# Patient Record
Sex: FEMALE | Race: WHITE | NOT HISPANIC OR LATINO | URBAN - METROPOLITAN AREA
[De-identification: names, ages, dates, MRNs, and addresses within clinical notes are randomized per-mention and may not be internally consistent; named-entity substitution may affect disease eponyms.]

---

## 2021-02-19 ENCOUNTER — LAB OUTSIDE AN ENCOUNTER (OUTPATIENT)
Dept: URBAN - METROPOLITAN AREA CLINIC 82 | Facility: CLINIC | Age: 71
End: 2021-02-19

## 2021-02-19 ENCOUNTER — OFFICE VISIT (OUTPATIENT)
Dept: URBAN - METROPOLITAN AREA CLINIC 82 | Facility: CLINIC | Age: 71
End: 2021-02-19
Payer: MEDICARE

## 2021-02-19 DIAGNOSIS — R10.32 LLQ ABDOMINAL PAIN: ICD-10-CM

## 2021-02-19 DIAGNOSIS — R14.3 FLATULENCE: ICD-10-CM

## 2021-02-19 DIAGNOSIS — R19.4 CHANGE IN BOWEL HABITS: ICD-10-CM

## 2021-02-19 DIAGNOSIS — R14.2 ERUCTATION: ICD-10-CM

## 2021-02-19 DIAGNOSIS — K21.9 GASTROESOPHAGEAL REFLUX DISEASE, UNSPECIFIED WHETHER ESOPHAGITIS PRESENT: ICD-10-CM

## 2021-02-19 DIAGNOSIS — R14.1 GAS PAIN: ICD-10-CM

## 2021-02-19 PROBLEM — 271834000: Status: ACTIVE | Noted: 2021-02-19

## 2021-02-19 PROBLEM — 129851009: Status: ACTIVE | Noted: 2021-02-19

## 2021-02-19 PROBLEM — 271832001: Status: ACTIVE | Noted: 2021-02-19

## 2021-02-19 PROBLEM — 235595009: Status: ACTIVE | Noted: 2021-02-19

## 2021-02-19 PROCEDURE — 3017F COLORECTAL CA SCREEN DOC REV: CPT | Performed by: INTERNAL MEDICINE

## 2021-02-19 PROCEDURE — G8420 CALC BMI NORM PARAMETERS: HCPCS | Performed by: INTERNAL MEDICINE

## 2021-02-19 PROCEDURE — 99204 OFFICE O/P NEW MOD 45 MIN: CPT | Performed by: INTERNAL MEDICINE

## 2021-02-19 PROCEDURE — G8427 DOCREV CUR MEDS BY ELIG CLIN: HCPCS | Performed by: INTERNAL MEDICINE

## 2021-02-19 PROCEDURE — G8482 FLU IMMUNIZE ORDER/ADMIN: HCPCS | Performed by: INTERNAL MEDICINE

## 2021-02-19 RX ORDER — ONDANSETRON HYDROCHLORIDE 8 MG/1
HALF TO 1 TABLET AS NEEDED TABLET, FILM COATED ORAL ONCE A DAY
Qty: 10 TABLET | Refills: 0 | OUTPATIENT
Start: 2021-02-19

## 2021-02-19 RX ORDER — MESALAMINE 1.2 G/1
TABLET, DELAYED RELEASE ORAL
Qty: 0 | Refills: 0 | Status: DISCONTINUED | COMMUNITY
Start: 1900-01-01

## 2021-02-19 RX ORDER — POLYETHYLENE GLYCOL 3350 17 G/17G
AS DIRECTED POWDER, FOR SOLUTION ORAL
Qty: 238 GRAM | Refills: 0 | OUTPATIENT
Start: 2021-02-19 | End: 2021-02-20

## 2021-02-19 RX ORDER — ASPIRIN 81 MG/1
1 TABLET TABLET, COATED ORAL ONCE A DAY
Status: ACTIVE | COMMUNITY

## 2021-02-19 RX ORDER — LISINOPRIL 20 MG/1
TAKE 1 TABLET (20 MG) BY ORAL ROUTE ONCE DAILY TABLET ORAL 1
Qty: 0 | Refills: 0 | Status: ACTIVE | COMMUNITY
Start: 1900-01-01

## 2021-02-19 RX ORDER — ALENDRONATE SODIUM 70 MG
TAKE 1 TABLET (70 MG) BY ORAL ROUTE ONCE WEEKLY IN THE MORNING, AT LEAST 30 MIN BEFORE FIRST FOOD, BEVERAGE, OR MEDICATION OF DAY TABLET ORAL
Qty: 0 | Refills: 0 | Status: DISCONTINUED | COMMUNITY
Start: 1900-01-01

## 2021-02-19 NOTE — PHYSICAL EXAM HENT:
Head,  normocephalic,  atraumatic,  Face,  Face within normal limits,  Ears,  External ears within normal limits,  Nose/Nasopharynx,  External nose  normal appearance,  nares patent,  no nasal discharge,  Mouth and Throat,  Oral cavity appearance normal,  Breath odor normal,  Lips,  Appearance normal  upper dentures noted

## 2021-02-19 NOTE — PHYSICAL EXAM NEUROLOGIC:
benign tremor notedoriented to person, place and time , normal sensation , short and long term memory intact

## 2021-02-19 NOTE — PHYSICAL EXAM GASTROINTESTINAL
Abdomen , soft, nontender, nondistended , no guarding or rigidity , , normal bowel sounds , Liver and Spleen , no hepatomegaly present , no hepatosplenomegaly , liver nontender , spleen not palpable

## 2021-02-25 ENCOUNTER — CLAIMS CREATED FROM THE CLAIM WINDOW (OUTPATIENT)
Dept: URBAN - METROPOLITAN AREA CLINIC 4 | Facility: CLINIC | Age: 71
End: 2021-02-25
Payer: MEDICARE

## 2021-02-25 ENCOUNTER — OFFICE VISIT (OUTPATIENT)
Dept: URBAN - METROPOLITAN AREA SURGERY CENTER 13 | Facility: SURGERY CENTER | Age: 71
End: 2021-02-25
Payer: MEDICARE

## 2021-02-25 ENCOUNTER — TELEPHONE ENCOUNTER (OUTPATIENT)
Dept: URBAN - METROPOLITAN AREA CLINIC 92 | Facility: CLINIC | Age: 71
End: 2021-02-25

## 2021-02-25 DIAGNOSIS — B37.81 CANDIDAL ESOPHAGITIS: ICD-10-CM

## 2021-02-25 DIAGNOSIS — K31.89 ACQUIRED DEFORMITY OF DUODENUM: ICD-10-CM

## 2021-02-25 DIAGNOSIS — R10.32 ABDOMINAL CRAMPING IN LEFT LOWER QUADRANT: ICD-10-CM

## 2021-02-25 DIAGNOSIS — R10.13 ABDOMINAL DISCOMFORT, EPIGASTRIC: ICD-10-CM

## 2021-02-25 DIAGNOSIS — K31.89 DILATION OF STOMACH: ICD-10-CM

## 2021-02-25 DIAGNOSIS — B37.81 CANDIDA ESOPHAGITIS: ICD-10-CM

## 2021-02-25 DIAGNOSIS — K31.819 ANGIODYSPLASIA OF DUODENUM: ICD-10-CM

## 2021-02-25 PROCEDURE — 88312 SPECIAL STAINS GROUP 1: CPT | Performed by: PATHOLOGY

## 2021-02-25 PROCEDURE — 88305 TISSUE EXAM BY PATHOLOGIST: CPT | Performed by: PATHOLOGY

## 2021-02-25 PROCEDURE — 43239 EGD BIOPSY SINGLE/MULTIPLE: CPT | Performed by: INTERNAL MEDICINE

## 2021-02-25 PROCEDURE — 45378 DIAGNOSTIC COLONOSCOPY: CPT | Performed by: INTERNAL MEDICINE

## 2021-02-25 PROCEDURE — G8907 PT DOC NO EVENTS ON DISCHARG: HCPCS | Performed by: INTERNAL MEDICINE

## 2021-02-25 RX ORDER — FLUCONAZOLE 100 MG/1
2 TABS DAY ONE, THEN 1 TAB FOR 13 DAYS HOLD ATARVOSTATIN FOR 2 WEEKS TABLET ORAL ONCE A DAY
Qty: 15 TABLET | Refills: 0 | OUTPATIENT
End: 2021-03-11

## 2021-02-25 RX ORDER — LISINOPRIL 20 MG/1
TAKE 1 TABLET (20 MG) BY ORAL ROUTE ONCE DAILY TABLET ORAL 1
Qty: 0 | Refills: 0 | Status: ACTIVE | COMMUNITY
Start: 1900-01-01

## 2021-02-25 RX ORDER — ONDANSETRON HYDROCHLORIDE 8 MG/1
HALF TO 1 TABLET AS NEEDED TABLET, FILM COATED ORAL ONCE A DAY
Qty: 10 TABLET | Refills: 0 | Status: ACTIVE | COMMUNITY
Start: 2021-02-19

## 2021-02-25 RX ORDER — ASPIRIN 81 MG/1
1 TABLET TABLET, COATED ORAL ONCE A DAY
Status: ACTIVE | COMMUNITY

## 2021-03-29 ENCOUNTER — DASHBOARD ENCOUNTERS (OUTPATIENT)
Age: 71
End: 2021-03-29

## 2021-03-29 ENCOUNTER — OFFICE VISIT (OUTPATIENT)
Dept: URBAN - METROPOLITAN AREA CLINIC 82 | Facility: CLINIC | Age: 71
End: 2021-03-29
Payer: MEDICARE

## 2021-03-29 DIAGNOSIS — Q43.8 REDUNDANT COLON: ICD-10-CM

## 2021-03-29 DIAGNOSIS — R14.1 GAS PAIN: ICD-10-CM

## 2021-03-29 DIAGNOSIS — B37.81 CANDIDA ESOPHAGITIS: ICD-10-CM

## 2021-03-29 DIAGNOSIS — K59.01 CONSTIPATION BY DELAYED COLONIC TRANSIT: ICD-10-CM

## 2021-03-29 PROBLEM — 45979003: Status: ACTIVE | Noted: 2021-02-19

## 2021-03-29 PROBLEM — 253794002: Status: ACTIVE | Noted: 2021-03-29

## 2021-03-29 PROBLEM — 35298007: Status: ACTIVE | Noted: 2021-03-29

## 2021-03-29 PROBLEM — 20639004: Status: ACTIVE | Noted: 2021-03-29

## 2021-03-29 PROCEDURE — 99213 OFFICE O/P EST LOW 20 MIN: CPT | Performed by: INTERNAL MEDICINE

## 2021-03-29 RX ORDER — ASPIRIN 81 MG/1
1 TABLET TABLET, COATED ORAL ONCE A DAY
Status: ACTIVE | COMMUNITY

## 2021-03-29 RX ORDER — ONDANSETRON HYDROCHLORIDE 8 MG/1
HALF TO 1 TABLET AS NEEDED TABLET, FILM COATED ORAL ONCE A DAY
Qty: 10 TABLET | Refills: 0 | Status: ACTIVE | COMMUNITY
Start: 2021-02-19

## 2021-03-29 RX ORDER — LINACLOTIDE 72 UG/1
1 CAPSULE AT LEAST 30 MINUTES BEFORE THE FIRST MEAL OF THE DAY ON AN EMPTY STOMACH CAPSULE, GELATIN COATED ORAL ONCE A DAY
Qty: 90 CAPSULE | Refills: 1 | OUTPATIENT
Start: 2021-03-29 | End: 2021-09-25

## 2021-03-29 RX ORDER — LISINOPRIL 20 MG/1
TAKE 1 TABLET (20 MG) BY ORAL ROUTE ONCE DAILY TABLET ORAL 1
Qty: 0 | Refills: 0 | Status: ACTIVE | COMMUNITY
Start: 1900-01-01

## 2021-03-29 NOTE — HPI-TODAY'S VISIT:
Ms. Lopez is here for complaints of having abdominal pain left lower quadrant abdominal pain discomfort as well as fullness and abdominal discomfort.  She reports seeing Dr. Carrillo Huff for left lower quadrant abdominal swelling.  She underwent a CT scan of the abdomen pelvis that shows a fascial defect in the abdominal wall area in the left lower quadrant region otherwise no obvious hernia was noted in the left lower quadrant.  She was also noted to have some small gallstones however she denies any right upper quadrant pain.  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  Reports having abdominal discomfort but denies any vomiting.  She reports having discomfort when she is having a bowel movement improves after having a bowel movement.  Other complaints also includes pain when she presses on the abdominal wall area.  She feels been noticeably swelling in the left lower quadrant especially when she stands however he did better improves when she lays back. She also reports having epigastric discomfort when she eats food and also having intermittent reflux which has been going on for years.  In the past she had been taking some osteoporosis medications which makes her symptoms worse.  Denies any dysphagia.  3/29/21:  Pt reports improvement in her GI symptoms especially upper GI symptoms after taking 2 weeks of Diflucan for candida esophagitis. She was also noted to have severe redundancy of the colon. She is not on any other laxatives . Post colonoscopy she did not have a bm for 1 weeks. She had taken some dulcolax which has caused lower abdominal cramping. Diagnostic studies /Procedures:Relevant procedures performed to date: reviewed

## 2023-02-07 NOTE — HPI-TODAY'S VISIT:
Ms. Lopez is here for complaints of having abdominal pain left lower quadrant abdominal pain discomfort as well as fullness and abdominal discomfort.  She reports seeing Dr. Carrillo Huff for left lower quadrant abdominal swelling.  She underwent a CT scan of the abdomen pelvis that shows a fascial defect in the abdominal wall area in the left lower quadrant region otherwise no obvious hernia was noted in the left lower quadrant.  She was also noted to have some small gallstones however she denies any right upper quadrant pain.  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  Reports having abdominal discomfort but denies any vomiting.  She reports having discomfort when she is having a bowel movement improves after having a bowel movement.  Other complaints also includes pain when she presses on the abdominal wall area.  She feels been noticeably swelling in the left lower quadrant especially when she stands however he did better improves when she lays back. She also reports having epigastric discomfort when she eats food and also having intermittent reflux which has been going on for years.  In the past she had been taking some osteoporosis medications which makes her symptoms worse.  Denies any dysphagia.  normal balance